# Patient Record
Sex: FEMALE | Race: WHITE | NOT HISPANIC OR LATINO | URBAN - METROPOLITAN AREA
[De-identification: names, ages, dates, MRNs, and addresses within clinical notes are randomized per-mention and may not be internally consistent; named-entity substitution may affect disease eponyms.]

---

## 2018-02-23 ENCOUNTER — EMERGENCY (EMERGENCY)
Facility: HOSPITAL | Age: 18
LOS: 1 days | Discharge: HOME | End: 2018-02-23
Attending: EMERGENCY MEDICINE | Admitting: EMERGENCY MEDICINE
Payer: MEDICAID

## 2018-02-23 VITALS
WEIGHT: 121.25 LBS | OXYGEN SATURATION: 100 % | HEART RATE: 85 BPM | SYSTOLIC BLOOD PRESSURE: 134 MMHG | RESPIRATION RATE: 16 BRPM | DIASTOLIC BLOOD PRESSURE: 86 MMHG | HEIGHT: 66 IN | TEMPERATURE: 97 F

## 2018-02-23 PROCEDURE — 99282 EMERGENCY DEPT VISIT SF MDM: CPT

## 2018-02-23 PROCEDURE — 99285 EMERGENCY DEPT VISIT HI MDM: CPT

## 2018-02-23 NOTE — ED ADULT NURSE NOTE - [facility]PI ED SYMPTOMS NEG
no difficulty bearing weight/no crying/no decreased eating/drinking/no disorientation/no back pain/no loss of consciousness
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
## 2018-02-23 NOTE — ED PROVIDER NOTE - MUSCULOSKELETAL, MLM
Spine appears normal, range of motion is not limited, no muscle or joint tenderness. No midline spinal TTP, (+) paraspinal cervical muscle TTP, FROM cervical spine, bilateral hand  intact, 5/5motor function, pulses intact.
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
## 2025-06-02 NOTE — ED ADULT TRIAGE NOTE - SPO2 (%)
Diabetic pt here for Toenail trimming and foot care due to disease of the nail.          Podiatry Medications: no  Refill needed: no      Last saw PCP Steffi Lama MD 6/17/24  has seen Dr. 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    100